# Patient Record
Sex: FEMALE | Race: BLACK OR AFRICAN AMERICAN | NOT HISPANIC OR LATINO | URBAN - METROPOLITAN AREA
[De-identification: names, ages, dates, MRNs, and addresses within clinical notes are randomized per-mention and may not be internally consistent; named-entity substitution may affect disease eponyms.]

---
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
## 2023-04-25 ENCOUNTER — WEB ENCOUNTER (OUTPATIENT)
Dept: URBAN - METROPOLITAN AREA CLINIC 40 | Facility: CLINIC | Age: 23
End: 2023-04-25

## 2023-05-01 ENCOUNTER — OFFICE VISIT (OUTPATIENT)
Dept: URBAN - METROPOLITAN AREA CLINIC 40 | Facility: CLINIC | Age: 23
End: 2023-05-01
Payer: COMMERCIAL

## 2023-05-01 VITALS
HEIGHT: 65 IN | TEMPERATURE: 97.5 F | HEART RATE: 72 BPM | WEIGHT: 150.6 LBS | BODY MASS INDEX: 25.09 KG/M2 | DIASTOLIC BLOOD PRESSURE: 80 MMHG | SYSTOLIC BLOOD PRESSURE: 120 MMHG

## 2023-05-01 DIAGNOSIS — R74.8 ABNORMAL TRANSAMINASES: ICD-10-CM

## 2023-05-01 DIAGNOSIS — K76.0 FATTY LIVER: ICD-10-CM

## 2023-05-01 PROBLEM — 197321007: Status: ACTIVE | Noted: 2023-05-01

## 2023-05-01 PROBLEM — 365767001: Status: ACTIVE | Noted: 2023-05-01

## 2023-05-01 PROCEDURE — 99203 OFFICE O/P NEW LOW 30 MIN: CPT | Performed by: INTERNAL MEDICINE

## 2023-05-01 RX ORDER — BENZONATATE 100 MG/1
TAKE 1 CAPSULE (100 MG TOTAL) BY MOUTH EVERY 8 (EIGHT) HOURS CAPSULE ORAL
Qty: 21 EACH | Refills: 0 | Status: ON HOLD | COMMUNITY

## 2023-05-01 RX ORDER — NAPROXEN 375 MG/1
TAKE 1 TABLET BY MOUTH 2 TIMES A DAY WITH MEALS TABLET ORAL
Qty: 20 EACH | Refills: 0 | Status: ON HOLD | COMMUNITY

## 2023-05-01 RX ORDER — LIDOCAINE 50 MG/G
APPLY 1 PATCH TO SKIN ONCE DAILY REMOVE AND DISCARD PATCH WITHIN 12 HOURS OR AS DIRECTED PATCH CUTANEOUS
Qty: 30 EACH | Refills: 0 | Status: ON HOLD | COMMUNITY

## 2023-05-01 RX ORDER — METHOCARBAMOL 500 MG/1
TAKE 1 TABLET BY MOUTH EVERY 6 HOURS TABLET ORAL
Qty: 12 EACH | Refills: 0 | Status: ON HOLD | COMMUNITY

## 2023-05-01 RX ORDER — IBUPROFEN 600 MG/1
TAKE 1 TABLET BY MOUTH EVERY 8 HOURS AS NEEDED FOR PAIN TABLET, FILM COATED ORAL
Qty: 21 EACH | Refills: 0 | Status: ON HOLD | COMMUNITY

## 2023-05-01 NOTE — HPI-TODAY'S VISIT:
23 yo  female here for mild transaminases.  AST and ALT were in the 40s and 70s range.  Repeat liver enzymes were normal.  She had worked out heavily on the morning of her labs.  Elevated transaminases could be due to that.  Avoids hepatotoxic medications.  Very occasional alcohol.  Mother had colon cancer.  Currently does not have any GI issues.  Ultrasound showed mild fatty liver.

## 2023-11-09 ENCOUNTER — DASHBOARD ENCOUNTERS (OUTPATIENT)
Age: 23
End: 2023-11-09

## 2023-11-10 ENCOUNTER — OFFICE VISIT (OUTPATIENT)
Dept: URBAN - METROPOLITAN AREA CLINIC 40 | Facility: CLINIC | Age: 23
End: 2023-11-10

## 2023-11-10 RX ORDER — METHOCARBAMOL 500 MG/1
TAKE 1 TABLET BY MOUTH EVERY 6 HOURS TABLET ORAL
Qty: 12 EACH | Refills: 0 | Status: ACTIVE | COMMUNITY

## 2023-11-10 RX ORDER — NAPROXEN 375 MG/1
TAKE 1 TABLET BY MOUTH 2 TIMES A DAY WITH MEALS TABLET ORAL
Qty: 20 EACH | Refills: 0 | Status: ACTIVE | COMMUNITY

## 2023-11-10 RX ORDER — IBUPROFEN 600 MG/1
TAKE 1 TABLET BY MOUTH EVERY 8 HOURS AS NEEDED FOR PAIN TABLET, FILM COATED ORAL
Qty: 21 EACH | Refills: 0 | Status: ACTIVE | COMMUNITY

## 2023-11-10 RX ORDER — BENZONATATE 100 MG/1
TAKE 1 CAPSULE (100 MG TOTAL) BY MOUTH EVERY 8 (EIGHT) HOURS CAPSULE ORAL
Qty: 21 EACH | Refills: 0 | Status: ACTIVE | COMMUNITY

## 2023-11-10 RX ORDER — LIDOCAINE 50 MG/G
APPLY 1 PATCH TO SKIN ONCE DAILY REMOVE AND DISCARD PATCH WITHIN 12 HOURS OR AS DIRECTED PATCH CUTANEOUS
Qty: 30 EACH | Refills: 0 | Status: ACTIVE | COMMUNITY

## 2023-11-10 NOTE — HPI-TODAY'S VISIT:
23 yo  female here for mild transaminases.  AST and ALT were in the 40s and 70s range.  Repeat liver enzymes were normal.  She had worked out heavily on the morning of her labs.  Elevated transaminases could be due to that.  Avoids hepatotoxic medications.  Very occasional alcohol.  Mother had colon cancer.  Currently does not have any GI issues.  Ultrasound showed mild fatty liver. DNT 2/2 pain and safety concern

## 2023-12-11 ENCOUNTER — OFFICE VISIT (OUTPATIENT)
Dept: URBAN - METROPOLITAN AREA CLINIC 40 | Facility: CLINIC | Age: 23
End: 2023-12-11